# Patient Record
Sex: FEMALE | Race: WHITE | Employment: FULL TIME | URBAN - METROPOLITAN AREA
[De-identification: names, ages, dates, MRNs, and addresses within clinical notes are randomized per-mention and may not be internally consistent; named-entity substitution may affect disease eponyms.]

---

## 2017-08-04 ENCOUNTER — HOSPITAL ENCOUNTER (EMERGENCY)
Age: 22
Discharge: PSYCHIATRIC OR SUBSTANCE USE FACILITY | End: 2017-08-05
Attending: EMERGENCY MEDICINE | Admitting: EMERGENCY MEDICINE
Payer: OTHER GOVERNMENT

## 2017-08-04 DIAGNOSIS — T14.8XXA SUPERFICIAL LACERATION: ICD-10-CM

## 2017-08-04 DIAGNOSIS — R45.851 SUICIDAL IDEATIONS: Primary | ICD-10-CM

## 2017-08-04 DIAGNOSIS — Z72.89 DELIBERATE SELF-CUTTING: ICD-10-CM

## 2017-08-04 DIAGNOSIS — F14.10 COCAINE ABUSE (HCC): ICD-10-CM

## 2017-08-04 LAB
AMPHET UR QL SCN: NEGATIVE
ANION GAP BLD CALC-SCNC: 9 MMOL/L (ref 3–18)
BARBITURATES UR QL SCN: NEGATIVE
BASOPHILS # BLD AUTO: 0.1 K/UL (ref 0–0.06)
BASOPHILS # BLD: 1 % (ref 0–2)
BENZODIAZ UR QL: NEGATIVE
BUN SERPL-MCNC: 8 MG/DL (ref 7–18)
BUN/CREAT SERPL: 9 (ref 12–20)
CALCIUM SERPL-MCNC: 9.2 MG/DL (ref 8.5–10.1)
CANNABINOIDS UR QL SCN: POSITIVE
CHLORIDE SERPL-SCNC: 111 MMOL/L (ref 100–108)
CO2 SERPL-SCNC: 24 MMOL/L (ref 21–32)
COCAINE UR QL SCN: POSITIVE
CREAT SERPL-MCNC: 0.9 MG/DL (ref 0.6–1.3)
DIFFERENTIAL METHOD BLD: ABNORMAL
EOSINOPHIL # BLD: 0.1 K/UL (ref 0–0.4)
EOSINOPHIL NFR BLD: 1 % (ref 0–5)
ERYTHROCYTE [DISTWIDTH] IN BLOOD BY AUTOMATED COUNT: 12.4 % (ref 11.6–14.5)
GLUCOSE SERPL-MCNC: 65 MG/DL (ref 74–99)
HCG UR QL: NEGATIVE
HCT VFR BLD AUTO: 43.2 % (ref 35–45)
HDSCOM,HDSCOM: ABNORMAL
HGB BLD-MCNC: 14.9 G/DL (ref 12–16)
LYMPHOCYTES # BLD AUTO: 39 % (ref 21–52)
LYMPHOCYTES # BLD: 3.9 K/UL (ref 0.9–3.6)
MCH RBC QN AUTO: 31 PG (ref 24–34)
MCHC RBC AUTO-ENTMCNC: 34.5 G/DL (ref 31–37)
MCV RBC AUTO: 90 FL (ref 74–97)
METHADONE UR QL: NEGATIVE
MONOCYTES # BLD: 0.4 K/UL (ref 0.05–1.2)
MONOCYTES NFR BLD AUTO: 4 % (ref 3–10)
NEUTS SEG # BLD: 5.7 K/UL (ref 1.8–8)
NEUTS SEG NFR BLD AUTO: 55 % (ref 40–73)
OPIATES UR QL: POSITIVE
PCP UR QL: NEGATIVE
PLATELET # BLD AUTO: 310 K/UL (ref 135–420)
PMV BLD AUTO: 10.1 FL (ref 9.2–11.8)
POTASSIUM SERPL-SCNC: 3.6 MMOL/L (ref 3.5–5.5)
RBC # BLD AUTO: 4.8 M/UL (ref 4.2–5.3)
SODIUM SERPL-SCNC: 144 MMOL/L (ref 136–145)
WBC # BLD AUTO: 10.1 K/UL (ref 4.6–13.2)

## 2017-08-04 PROCEDURE — 81025 URINE PREGNANCY TEST: CPT

## 2017-08-04 PROCEDURE — 80048 BASIC METABOLIC PNL TOTAL CA: CPT | Performed by: EMERGENCY MEDICINE

## 2017-08-04 PROCEDURE — 85025 COMPLETE CBC W/AUTO DIFF WBC: CPT | Performed by: EMERGENCY MEDICINE

## 2017-08-04 PROCEDURE — 99285 EMERGENCY DEPT VISIT HI MDM: CPT

## 2017-08-04 PROCEDURE — 80307 DRUG TEST PRSMV CHEM ANLYZR: CPT | Performed by: EMERGENCY MEDICINE

## 2017-08-04 PROCEDURE — 74011250637 HC RX REV CODE- 250/637: Performed by: EMERGENCY MEDICINE

## 2017-08-04 RX ORDER — IBUPROFEN 400 MG/1
800 TABLET ORAL ONCE
Status: COMPLETED | OUTPATIENT
Start: 2017-08-04 | End: 2017-08-04

## 2017-08-04 RX ORDER — FLUOXETINE HYDROCHLORIDE 20 MG/1
20 CAPSULE ORAL DAILY
Status: DISCONTINUED | OUTPATIENT
Start: 2017-08-05 | End: 2017-08-05 | Stop reason: HOSPADM

## 2017-08-04 RX ADMIN — IBUPROFEN 800 MG: 400 TABLET, FILM COATED ORAL at 22:33

## 2017-08-04 RX ADMIN — BACITRACIN ZINC, NEOMYCIN SULFATE, POLYMYXIN B SULFATE 1 PACKET: 3.5; 5000; 4 OINTMENT TOPICAL at 20:05
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
## 2017-08-05 ENCOUNTER — HOSPITAL ENCOUNTER (INPATIENT)
Age: 22
LOS: 3 days | Discharge: HOME | DRG: 885 | End: 2017-08-08
Attending: PSYCHIATRY & NEUROLOGY | Admitting: PSYCHIATRY & NEUROLOGY
Payer: OTHER GOVERNMENT

## 2017-08-05 VITALS
HEIGHT: 63 IN | RESPIRATION RATE: 17 BRPM | HEART RATE: 76 BPM | SYSTOLIC BLOOD PRESSURE: 114 MMHG | OXYGEN SATURATION: 100 % | TEMPERATURE: 97.9 F | WEIGHT: 90 LBS | BODY MASS INDEX: 15.95 KG/M2 | DIASTOLIC BLOOD PRESSURE: 65 MMHG

## 2017-08-05 PROBLEM — F32.A DEPRESSION: Status: ACTIVE | Noted: 2017-08-05

## 2017-08-05 PROCEDURE — 74011250637 HC RX REV CODE- 250/637: Performed by: PSYCHIATRY & NEUROLOGY

## 2017-08-05 PROCEDURE — 74011250637 HC RX REV CODE- 250/637: Performed by: EMERGENCY MEDICINE

## 2017-08-05 PROCEDURE — 65220000003 HC RM SEMIPRIVATE PSYCH

## 2017-08-05 RX ORDER — DEXTROAMPHETAMINE SACCHARATE, AMPHETAMINE ASPARTATE, DEXTROAMPHETAMINE SULFATE AND AMPHETAMINE SULFATE 2.5; 2.5; 2.5; 2.5 MG/1; MG/1; MG/1; MG/1
10 TABLET ORAL 3 TIMES DAILY
COMMUNITY
End: 2017-08-08

## 2017-08-05 RX ORDER — BENZTROPINE MESYLATE 1 MG/1
1-2 TABLET ORAL
Status: DISCONTINUED | OUTPATIENT
Start: 2017-08-05 | End: 2017-08-08 | Stop reason: HOSPADM

## 2017-08-05 RX ORDER — FLUOXETINE HYDROCHLORIDE 20 MG/1
20 CAPSULE ORAL DAILY
Status: DISCONTINUED | OUTPATIENT
Start: 2017-08-05 | End: 2017-08-08 | Stop reason: HOSPADM

## 2017-08-05 RX ORDER — IBUPROFEN 200 MG
1 TABLET ORAL DAILY
Status: DISCONTINUED | OUTPATIENT
Start: 2017-08-05 | End: 2017-08-05

## 2017-08-05 RX ORDER — LORAZEPAM 2 MG/ML
1-2 INJECTION INTRAMUSCULAR
Status: DISCONTINUED | OUTPATIENT
Start: 2017-08-05 | End: 2017-08-08 | Stop reason: HOSPADM

## 2017-08-05 RX ORDER — IBUPROFEN 200 MG
1 TABLET ORAL DAILY
Status: DISCONTINUED | OUTPATIENT
Start: 2017-08-05 | End: 2017-08-05 | Stop reason: HOSPADM

## 2017-08-05 RX ORDER — TRAZODONE HYDROCHLORIDE 50 MG/1
50 TABLET ORAL
Status: DISCONTINUED | OUTPATIENT
Start: 2017-08-05 | End: 2017-08-08 | Stop reason: HOSPADM

## 2017-08-05 RX ORDER — IBUPROFEN 400 MG/1
400 TABLET ORAL
Status: DISCONTINUED | OUTPATIENT
Start: 2017-08-05 | End: 2017-08-08 | Stop reason: HOSPADM

## 2017-08-05 RX ORDER — BENZTROPINE MESYLATE 1 MG/ML
1-2 INJECTION INTRAMUSCULAR; INTRAVENOUS
Status: DISCONTINUED | OUTPATIENT
Start: 2017-08-05 | End: 2017-08-08 | Stop reason: HOSPADM

## 2017-08-05 RX ORDER — ATOMOXETINE 10 MG/1
CAPSULE ORAL DAILY
COMMUNITY
End: 2017-08-08

## 2017-08-05 RX ORDER — LORAZEPAM 1 MG/1
1-2 TABLET ORAL
Status: DISCONTINUED | OUTPATIENT
Start: 2017-08-05 | End: 2017-08-08 | Stop reason: HOSPADM

## 2017-08-05 RX ORDER — IBUPROFEN 200 MG
1 TABLET ORAL DAILY
Status: DISCONTINUED | OUTPATIENT
Start: 2017-08-05 | End: 2017-08-08 | Stop reason: HOSPADM

## 2017-08-05 RX ORDER — HALOPERIDOL 5 MG/ML
5 INJECTION INTRAMUSCULAR
Status: DISCONTINUED | OUTPATIENT
Start: 2017-08-05 | End: 2017-08-08 | Stop reason: HOSPADM

## 2017-08-05 RX ORDER — HYDROXYZINE PAMOATE 50 MG/1
50 CAPSULE ORAL
Status: DISCONTINUED | OUTPATIENT
Start: 2017-08-05 | End: 2017-08-08 | Stop reason: HOSPADM

## 2017-08-05 RX ORDER — HALOPERIDOL 5 MG/1
5 TABLET ORAL
Status: DISCONTINUED | OUTPATIENT
Start: 2017-08-05 | End: 2017-08-08 | Stop reason: HOSPADM

## 2017-08-05 RX ADMIN — FLUOXETINE 20 MG: 20 CAPSULE ORAL at 10:22

## 2017-08-05 RX ADMIN — IBUPROFEN 400 MG: 400 TABLET, FILM COATED ORAL at 15:49

## 2017-08-05 RX ADMIN — TRAZODONE HYDROCHLORIDE 50 MG: 50 TABLET ORAL at 20:36

## 2017-08-05 NOTE — IP AVS SNAPSHOT
[place] 
 
 
 [address] [facility] Patient: Linnette Holt MRN: [id number] :1995 You are allergic to the following No active allergies Recent Documentation Height Weight Breastfeeding? BMI OB Status Smoking Status 1.6 m 40.8 kg No 15.94 kg/m2 Having regular periods Current Every Day Smoker Emergency Contacts Name Discharge Info Relation Home Work [facility] About your hospitalization You were admitted on:  2017 You last received care in the:  [facility] ADULT CHEM DEP You were discharged on:  2017 Unit phone number:  [phone] Why you were hospitalized Your primary diagnosis was:  Recurrent Major Depressive Disorder (Hcc) Your diagnoses also included:  Cocaine Use Disorder, Moderate, Dependence (Hcc) Providers Seen During Your Hospitalizations Provider Role Specialty Primary office phone Elaina Howard MD Attending Provider Psychiatry [phone] Your Primary Care Physician (PCP) Primary Care Physician Office Phone Office Fax NONE ** None ** ** None ** Follow-up Information Follow up With Details Comments Contact Info None   None ([phone]) Patient stated that they have no PCP [facility]   Appointment on 2017 at 10:45 am with Ms. Ramo Sherwood NP for medication and Sept.15, 2017 at 9:45 am for therapy with Ms. Jacqui Valencia. [address] [facility] [address] [place] [address]) [facility] [place] [address]   HE PATIENT MUST CALL  [facility] FOR A REFERRAL IN ORDER TO BE SEEN. Current Discharge Medication List  
  
START taking these medications Dose & Instructions Dispensing Information Comments Morning Noon Evening Bedtime FLUoxetine 20 mg capsule Commonly known as:  PROzac Your last dose was: Your next dose is:    
   
   
 Dose:  20 mg Take 1 Cap by mouth daily. Indications: depression Quantity:  30 Cap Refills:  0 STOP taking these medications ADDERALL 10 mg tablet Generic drug:  dextroamphetamine-amphetamine STRATTERA 10 mg capsule Generic drug:  atomoxetine Where to Get Your Medications Information on where to get these meds will be given to you by the nurse or doctor. ! Ask your nurse or doctor about these medications FLUoxetine 20 mg capsule Discharge Instructions BEHAVIORAL HEALTH NURSING DISCHARGE NOTE Emergency Numbers [address] [facility] Desk: [phone] [facility]: [phone] Suicide Prevention Line: [phone] (TALK) The following personal items collected during your admission are returned to you:  
Dental Appliance: Dental Appliances: None Vision: Visual Aid: None Hearing Aid:   
Jewelry: Jewelry: None Clothing: Clothing: Belt, Undergarments, Shorts (manohar shorts, tank top, bra with underwire, underwear belt NO SHOES) Other Valuables: Other Valuables: Cell Phone (cell phone cracked screen) Valuables sent to safe: Personal Items Sent to Safe: none PATIENT INSTRUCTIONS: 
 
 
. 
 
 diet: Regular Diet The discharge information has been reviewed with the patient. The patient verbalized understanding. Patient armband removed and shredded. Discharge Orders None [place] Announcement We are excited to announce that we are making your provider's discharge notes available to you in Goby. You will see these notes when they are completed and signed by the physician that discharged you from your recent hospital stay.   If you have any questions or concerns about any information you see in Goby, please call the Health Information Department where you were seen or reach out to your Primary Care Provider for more information about your plan of care. Introducing [facility]! [employer] introduces Reissued patient portal. Now you can access parts of your medical record, email your doctor's office, and request medication refills online. 1. In your internet browser, go to [email or web address] 2. Click on the First Time User? Click Here link in the Sign In box. You will see the New Member Sign Up page. 3. Enter your Reissued Access Code exactly as it appears below. You will not need to use this code after youve completed the sign-up process. If you do not sign up before the expiration date, you must request a new code. · Reissued Access Code: [email or web address] Expires: 11/5/2017  8:20 AM 
 
4. Enter the last four digits of your Social Security Number (xxxx) and Date of Birth (mm/dd/yyyy) as indicated and click Submit. You will be taken to the next sign-up page. 5. Create a Reissued ID. This will be your Reissued login ID and cannot be changed, so think of one that is secure and easy to remember. 6. Create a Reissued password. You can change your password at any time. 7. Enter your Password Reset Question and Answer. This can be used at a later time if you forget your password. 8. Enter your e-mail address. You will receive e-mail notification when new information is available in [address] 9. Click Sign Up. You can now view and download portions of your medical record. 10. Click the Download Summary menu link to download a portable copy of your medical information. If you have questions, please visit the Frequently Asked Questions section of the Reissued website. Remember, Reissued is NOT to be used for urgent needs. For medical emergencies, dial 911. Now available from your iPhone and Android! General Information Please provide this summary of care documentation to your next provider.  
  
  
    
    
 Patient Signature: ____________________________________________________________ Date:  ____________________________________________________________  
  
Rexann Ports Provider Signature:  ____________________________________________________________ Date:  ____________________________________________________________
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
## 2017-08-05 NOTE — IP AVS SNAPSHOT
[place] 
 
 
 [address] [facility] [address] Patient: Albertine Dubin MRN: [id number] :1995 Current Discharge Medication List  
  
START taking these medications Dose & Instructions Dispensing Information Comments Morning Noon Evening Bedtime FLUoxetine 20 mg capsule Commonly known as:  PROzac Your last dose was: Your next dose is:    
   
   
 Dose:  20 mg Take 1 Cap by mouth daily. Indications: depression Quantity:  30 Cap Refills:  0 STOP taking these medications ADDERALL 10 mg tablet Generic drug:  dextroamphetamine-amphetamine STRATTERA 10 mg capsule Generic drug:  atomoxetine Where to Get Your Medications Information on where to get these meds will be given to you by the nurse or doctor. ! Ask your nurse or doctor about these medications FLUoxetine 20 mg capsule

## 2017-08-05 NOTE — ED NOTES
Patient requests nicotine patch. Patient states she smokes 1 pack a day. Dr. Carter Esters notified.

## 2017-08-05 NOTE — ED NOTES
Patient has unwrapped bandages. Patient complains of wrist pain. Dr. Ilsa Alonso notified. Orders received, see MAR.

## 2017-08-05 NOTE — BSMART NOTE
Counselor contacted Sandy at [facility] and William Pedro at [facility] regarding an inpatient bed. Sandy requested the MRN number and William Pedro requested that the chart be faxed. This was done. Still waiting on a response. Lona Grossman.  Marychuy Arana, Ph.D., LPC, CSAC, BCPC, CCTP, CAMS-I  Crisis Counselor
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
## 2017-08-05 NOTE — ED NOTES
9:00 PM: I assumed care of the pt from Dr. Zuleyma Grigsby while pt awaited bed placement. There were no acute events while pt was in the department. Disposition: Transferred    Scribe Attestation      Faiza Leroy acting as a scribe for and in the presence of Terrell Mcdermott MD      August 05, 2017 at 4:08 AM       Provider Attestation:      I personally performed the services described in the documentation, reviewed the documentation, as recorded by the scribe in my presence, and it accurately and completely records my words and actions.  August 05, 2017 at 4:08 AM - Terrell Mcdermott MD

## 2017-08-05 NOTE — BH NOTES
GROUP THERAPY PROGRESS NOTE    [facility] is participating in [facility].      Group time: 20 minutes    Personal goal for participation: talk w/md      Goal orientation: community    Group therapy participation: active    Therapeutic interventions reviewed and discussed: rules and regulations

## 2017-08-05 NOTE — ED NOTES
TRANSFER - OUT REPORT:    Verbal report given to Karan Rousseau RN (name) on Sasha Solis  being transferred to [facility] for routine progression of care       Report consisted of patients Situation, Background, Assessment and   Recommendations(SBAR). Information from the following report(s) SBAR, Kardex and MAR was reviewed with the receiving nurse. Lines:       Opportunity for questions and clarification was provided.       Patient transported with:  [facility]

## 2017-08-05 NOTE — BH NOTES
Pt ate all meals, in and out of milieu, interacting with staff. Denies SI/HI,A/VH. Resting in bed asleep during some part of morning and afternoon after meals. Encouraged use of gripper socks, voiced no concerns. VSS.

## 2017-08-05 NOTE — IP AVS SNAPSHOT
Summary of Care Report The Summary of Care report has been created to help improve care coordination. Users with access to [employer] [place] or [employer] (Web-based application) may access additional patient information including the Discharge Summary. If you are not currently a [employer] user and need more information, please call the number listed below in the [place] [address] section and ask to be connected with Medical Records. Facility Information Name Address Phone [place] [id number] [facility] [address] [phone] Patient Information Patient Name Sex  Hossein Barr ([id number]) Female 1995 Discharge Information Admitting Provider Service Area Unit Mar Rodas MD / [address] Adult Chem Dep / [phone] Discharge Provider Discharge Date/Time Discharge Disposition Destination Pastora Paulson MD / [phone] 17 [address] [facility] Patient Language Language ENGLISH [13] Hospital Problems as of 2017  Never Reviewed Class Noted - Resolved Last Modified POA Active Problems * (Principal)Recurrent major depressive disorder ([facility] [address]  2017 - Present 2017 by Pastora Paulson MD Unknown Entered by Pastora Paulson MD  
  Cocaine use disorder, moderate, dependence ([facility] [address]  2017 - Present 2017 by Pastora Paulson MD Unknown Entered by Pastora Paulson MD  
  
You are allergic to the following No active allergies Current Discharge Medication List  
  
START taking these medications Dose & Instructions Dispensing Information Comments FLUoxetine 20 mg capsule Commonly known as:  PROzac Dose:  20 mg Take 1 Cap by mouth daily. Indications: depression Quantity:  30 Cap Refills:  0 STOP taking these medications Comments ADDERALL 10 mg tablet Generic drug:  dextroamphetamine-amphetamine STRATTERA 10 mg capsule Generic drug:  atomoxetine Follow-up Information Follow up With Details Comments Contact Info None   None (395) Patient stated that they have no PCP [facility]   Appointment on Sept.1, 2017 at 10:45 am with MsCarin Scruggs NP for medication and Sept.15, 2017 at 9:45 am for therapy with Ms. Aydee Freeman. [address] [facility] [address] [address] ([facility]) [facility]  [address]   HE PATIENT MUST CALL  [facility] IN ADVANCE FOR A REFERRAL IN ORDER TO BE SEEN. Discharge Instructions BEHAVIORAL HEALTH NURSING DISCHARGE NOTE Emergency Numbers [address] [facility] Desk: [phone] [facility] Emergency Services: [phone] Suicide Prevention Line: [phone] (TALK) The following personal items collected during your admission are returned to you:  
Dental Appliance: Dental Appliances: None Vision: Visual Aid: None Hearing Aid:   
Jewelry: Jewelry: None Clothing: Clothing: Belt, Undergarments, Shorts (manohar shorts, tank top, bra with underwire, underwear belt NO SHOES) Other Valuables: Other Valuables: Cell Phone (cell phone cracked screen) Valuables sent to safe: Personal Items Sent to Safe: none PATIENT INSTRUCTIONS: 
 
 
. 
 
 diet: Regular Diet The discharge information has been reviewed with the patient. The patient verbalized understanding. Patient armband removed and shredded. Chart Review Routing History No Routing History on File

## 2017-08-05 NOTE — BH NOTES
4110 patient arrived on ADCD Unit. Belongings checked and noted, vital signs done. Patient polite and appropriate with staff. Report given to day nurse from [address] [facility] [address]. Patient sitting quietly in day area.

## 2017-08-05 NOTE — BH NOTES
GROUP THERAPY PROGRESS NOTE    Linnette Holt is participating in Recreational Therapy. Group time: 6685    Personal goal for participation: fresh air break/games on the unit    Goal orientation: social    Group therapy participation: active    Therapeutic interventions reviewed and discussed: Staff encouraged Pt to get off the unit and go outside and get some fresh air, or play games on the unit with peers. Impression of participation:   Pt. chose to stay on the unit, play games with peers, color gabrielle patterns and watch a movie.

## 2017-08-05 NOTE — PROGRESS NOTES
NUTRITION    Nutrition Screen      RECOMMENDATIONS / PLAN:     - Add supplements: Ensure Enlive TID.  - Continue RD inpatient monitoring and evaluation. NUTRITION DIAGNOSIS & INTERVENTIONS:     [x] Meals/Snacks: general/healthful diet  [x] Medical food supplementation     Nutrition Diagnosis:  Underweight related to inadequate energy intake as evidenced by BMI of 15.9 kg/m^2. ASSESSMENT:      Pt just admitted, started on regular diet. Low BG noted. Average intake adequate to meet patients estimated nutritional needs:   [] Yes     [] No      [x] Unable to determine at this time    Diet: DIET REGULAR    Food Allergies: NKFA  Current Appetite:   [] Good     [] Fair     [] Poor     [x] Other: unknown   Appetite/meal intake prior to admission:   [] Good     [] Fair     [] Poor     [x] Other: unknown   Feeding Limitations:  [] Swallowing Difficulty       [] Chewing Difficulty       [] Other   Current Meal Intake: No data found. Gastrointestinal Issues:  [] Yes    [] No   Skin Integrity:  WDL    Pertinent Medications:  Reviewed   Labs:  Reviewed     Anthropometrics:  Ht Readings from Last 1 Encounters:   08/05/17 5' 3\" (1.6 m)     Last 3 Recorded Weights in this Encounter    08/05/17 0722   Weight: 40.8 kg (90 lb)     Body mass index is 15.94 kg/(m^2). Underweight     Weight History:   Weight Metrics 8/5/2017 8/4/2017   Weight 90 lb 90 lb   BMI 15.94 kg/m2 15.94 kg/m2       Admitting Diagnosis: Depression  No past medical history on file.      Education Needs:        [x] None identified  [] Identified - Not appropriate at this time  []  Identified and addressed - refer to education log  Learning Limitations:   [x] None identified  [] Identified    Cultural, Presybeterian & ethnic food preferences identified:  [x] None    [] Yes      ESTIMATED NUTRITION NEEDS:     1472-5405 kcal (MSJx1.2-1.3), 42-52 gm protein (0.8-1 gm/kg), 1 mL/kcal  Based on: 52 kg       [] Actual BW      [x] IBW         MONITORING & EVALUATION:     Nutrition Goal(s):   1. Po intake of meals will meet >75% of patient estimated nutritional needs within the next 7 days.   Outcome:   [] Met    []  Not Met   [x] New/Initial Goal    Monitor:  [x] Meal intake    [x] Diet tolerance    [x] Supplement intake    Previous Recommendations (for follow-up assessments only):     []   Implemented       []   Not Implemented (RD to address)    [] No Recommendation Made      Discharge Planning: regular diet   [x]  Participated in care planning, discharge planning, & interdisciplinary rounds as appropriate      Gaetano Loza, [address]    Pager: [phone]

## 2017-08-05 NOTE — H&P
Psychiatry History and Physical    Subjective:     Date of Evaluation:  8/5/2017    Reason for Referral:  Sasha Solis was referred to the examiners from ER/[facility] for SI-cut self. History of Presenting Problem: 21y/o C female in NAD well developed and nourished. Reports she is not suicidal. UDS +Cocaine/Opiates/THC. Medical problems: ADHD, Depression, Bipolar, STEPHANIE and Polysubstance abuse. Self cutter. There are no active problems to display for this patient. No past medical history on file. No past surgical history on file. No family history on file.    Social History   Substance Use Topics    Smoking status: Current Every Day Smoker    Smokeless tobacco: Never Used    Alcohol use Yes     Prior to Admission medications    Not on File     No Known Allergies     Review of Systems - History obtained from chart review and the patient  General ROS: positive for  - sleep disturbance  Psychological ROS: positive for - depression and sleep disturbances  Respiratory ROS: no cough, shortness of breath, or wheezing  Cardiovascular ROS: no chest pain or dyspnea on exertion  Gastrointestinal ROS: no abdominal pain, change in bowel habits, or black or bloody stools  Genito-Urinary ROS: no dysuria, trouble voiding, or hematuria  Musculoskeletal ROS: negative  Neurological ROS: no TIA or stroke symptoms  Dermatological ROS: has superficial lac to Left FA-self inflicted      Objective:     Patient Vitals for the past 8 hrs:   BP Temp Pulse Resp Height Weight   08/05/17 0722 - - - - 5' 3\" (1.6 m) 40.8 kg (90 lb)   08/05/17 0713 110/74 97.4 °F (36.3 °C) 64 14 - -       Mental Status exam: WNL except for    Sensorium  oriented to time, place and person   Orientation person, place, time/date and situation   Relations cooperative   Eye Contact appropriate   Appearance:  age appropriate and within normal Limits   Motor Behavior:  gait stable and within normal limits   Speech:  normal volume   Vocabulary average   Thought Process: logical   Thought Content free of delusions and free of hallucinations   Suicidal ideations no plan  and no intention   Homicidal ideations no plan  and no intention   Mood:  depressed   Affect:  depressed   Memory recent  adequate   Memory remote:  adequate   Concentration:  adequate   Abstraction:  concrete   Insight:  good   Reliability fair   Judgment:  fair           Physical Exam:   Visit Vitals    /74 (BP 1 Location: Right arm, BP Patient Position: At rest)    Pulse 64    Temp 97.4 °F (36.3 °C)    Resp 14    Ht 5' 3\" (1.6 m)    Wt 40.8 kg (90 lb)    BMI 15.94 kg/m2     General appearance: alert, cooperative, no distress, appears stated age  Head: Normocephalic, without obvious abnormality, atraumatic  Eyes: negative  Ears: normal TM's and external ear canals AU  Nose: Nares normal. Septum midline. Mucosa normal. No drainage or sinus tenderness. Throat: Lips, mucosa, and tongue normal. Teeth and gums normal  Neck: supple, symmetrical, trachea midline, no adenopathy, thyroid: not enlarged, symmetric, no tenderness/mass/nodules, no carotid bruit and no JVD  Back: symmetric, no curvature. ROM normal. No CVA tenderness. Lungs: clear to auscultation bilaterally  Heart: regular rate and rhythm, S1, S2 normal, no murmur, click, rub or gallop  Abdomen: soft, non-tender. Bowel sounds normal. No masses,  no organomegaly  Extremities: extremities normal, atraumatic, no cyanosis or edema  Pulses: 2+ and symmetric  Skin: Skin color, texture, turgor normal. No rashes or lesions or has superficial lac to left FA-self inflicted  Lymph nodes: Cervical, supraclavicular, and axillary nodes normal.  Neurologic: Grossly normal        Impression:    Active Problems:    * No active hospital problems.  *        Plan:     Recommendations for Treatment/Conditions:  Psychiatric treatment recommended while in hospital  Admit to Psych services for SI                                                  Depression with anxiety                                                  Bipolar                                                  Polysubstance abuse    Referral To: Inpatient psychiatric care    Competency Statement: At the current time, the patient is competent to make informed consent regarding their current medical care and discharge planning and/or financial decisions.       [employer], [place]   8/5/2017 9:05 AM

## 2017-08-06 PROCEDURE — 65220000003 HC RM SEMIPRIVATE PSYCH

## 2017-08-06 PROCEDURE — 74011250637 HC RX REV CODE- 250/637: Performed by: PSYCHIATRY & NEUROLOGY

## 2017-08-06 RX ADMIN — TRAZODONE HYDROCHLORIDE 50 MG: 50 TABLET ORAL at 21:07

## 2017-08-06 RX ADMIN — FLUOXETINE 20 MG: 20 CAPSULE ORAL at 08:34

## 2017-08-06 NOTE — BH NOTES
Gregory Michael is not participating in Recreational Therapy. Group time: 9168    Personal goal for participation: fresh air break    Goal orientation: social    Group therapy participation: refuse    Therapeutic interventions reviewed and discussed: Staff encouraged Pt.  To participate in group    Impression of participation: Pt refuse, chose to rest in bed despite staff encouragement

## 2017-08-06 NOTE — BH NOTES
Psychiatry progress note    Chart reviewed, patient interviewed. Patient complained she feels like shit. Complained she feels sad, has low energy and went back to bed after waking up. Patient asked about drug use. Reported she was using cannabis daily and cocaine twice per week. Patient stated she has spoken to her  who is going to be released from [facility]. Patient pleased with this stated they will be starting over completely and moving to a different city. Patient wants to be around family right now. Patient unsure if Prozac is helping doesn't feel she has taken it long enough. On exam, depressed. No SI, HI or AVH. Insight and judgment fair. Meds - Prozac 20. Assessment - Recurrent major depressive disorder, severe without psychotic  Features. Additional diagnoses of cocaine use disorder, severe and cannabis use disorder, severe are warranted. Symptoms not optimally controlled. Plan is to continue current treatment plan.

## 2017-08-06 NOTE — PROGRESS NOTES
Problem: Suicide/Homicide (Adult/Pediatric)  Goal: *STG: Seeks staff when feelings of self harm or harm towards others arise  Patient will talk with staff every shift while awake re: feelings of self harm if they arise throughout hospital stay. Outcome: Progressing Towards Goal  Denies feelings of self harm or harm towards others, denies A/VH  Goal: *STG: Attends activities and groups  Patient will attend at least 2 groups/activities every day throughout hospital stay. Outcome: Not Progressing Towards Goal  Pt does not attend groups and activities  Goal: *STG/LTG: Complies with medication therapy  Patient will take prescribed medications as ordered throughout hospital stay. Outcome: Progressing Towards Goal  Pt compliant with medication therapy    Comments:   Patient is calm, pleasant, cooperative, smiles during assessment. She sleeps most of the day, states she had 10 hours of sleep, woke up twice and went back to sleep. She states she sleeps 12 hours at home and just want to sleep. Pt compliant with medication therapy. She ate some of her breakfast and refused lunch with staff encouragement, offered twice and declined. Pt does not attend group and activities. She denies feelings of harm to self or others, denies A/VH, contracts for safety. Pt admits to being depressed. Encouraged positive coping strategies and life skills in dealing with stressors, and use of relaxation techniques. Pt is receptive and interested. She got out of bed and encouraged to do personal hygiene before seeing doctor shortly.  Continues monitor safety and provide supportive environment

## 2017-08-06 NOTE — BH NOTES
GROUP THERAPY PROGRESS NOTE    Jenene Lennox is participating in Recreational Therapy. Group time: 50 minutes    Personal goal for participation: Fresh air and activity group downstairs    Goal orientation: relaxation    Group therapy participation: active    Therapeutic interventions reviewed and discussed: Active    Impression of participation: Patient enjoyed going outside for fresh air and recreation group.

## 2017-08-06 NOTE — BH NOTES
Patient states she is feeling \"okay\" today. Patient has been pleasant and cooperative throughout the shift, spending most of the evening out in the day area socializing with peers and coloring. Patient attended groups on the unit and did not have any visitors this afternoon. Patient has been medication compliant, ate 100% of meals and snacks and wore non-slip footwear throughout the shift. Patient had no falls this evening.

## 2017-08-06 NOTE — BH NOTES
GROUP THERAPY PROGRESS NOTE    Nissa Serrano is not  participating in [place]. Staff encouraged and pt refused.

## 2017-08-07 PROBLEM — F33.9 RECURRENT MAJOR DEPRESSIVE DISORDER (HCC): Status: ACTIVE | Noted: 2017-08-07

## 2017-08-07 PROBLEM — F14.20 COCAINE USE DISORDER, MODERATE, DEPENDENCE (HCC): Status: ACTIVE | Noted: 2017-08-07

## 2017-08-07 PROCEDURE — 74011250637 HC RX REV CODE- 250/637: Performed by: PSYCHIATRY & NEUROLOGY

## 2017-08-07 PROCEDURE — 65220000003 HC RM SEMIPRIVATE PSYCH

## 2017-08-07 RX ORDER — MAG HYDROX/ALUMINUM HYD/SIMETH 200-200-20
30 SUSPENSION, ORAL (FINAL DOSE FORM) ORAL
Status: DISCONTINUED | OUTPATIENT
Start: 2017-08-07 | End: 2017-08-08 | Stop reason: HOSPADM

## 2017-08-07 RX ADMIN — FLUOXETINE 20 MG: 20 CAPSULE ORAL at 08:28

## 2017-08-07 RX ADMIN — TRAZODONE HYDROCHLORIDE 50 MG: 50 TABLET ORAL at 20:39

## 2017-08-07 NOTE — PROGRESS NOTES
Problem: Suicide/Homicide (Adult/Pediatric)  Goal: *STG: Seeks staff when feelings of self harm or harm towards others arise  Patient will talk with staff every shift while awake re: feelings of self harm if they arise throughout hospital stay. Outcome: Progressing Towards Goal  Patient contracts for safety. Goal: *STG: Attends activities and groups  Patient will attend at least 2 groups/activities every day throughout hospital stay. Outcome: Progressing Towards Goal  Patient has been attending groups today. Goal: *STG/LTG: Complies with medication therapy  Patient will take prescribed medications as ordered throughout hospital stay. Outcome: Progressing Towards Goal  Patient takes medications as ordered. Comments:   Patient has been present in the milieu today socializing with peers. Patient states that last evening she had an epiphany and feels better. Patient denies SI/HI and AVH and states that she was wrong in attempting to harm herself. Patient was hoping to be discharged today but understands that the doctor wants to wait a day or two and she is understanding of that.
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
## 2017-08-07 NOTE — BH NOTES
GROUP THERAPY PROGRESS NOTE    Linnette Holt is participating in Coping Skills Group and Positive thoughts. Group time: 1.5 hour    Goal orientation: personal    Group therapy participation: active    Therapeutic interventions reviewed and discussed:  Positive self-talk, coping skills. Patients were given a small pocket-sized journal, lists of positive affirmations, and a variety of craft materials to begin a journal for positive affirmations use as a coping skill. Impression of participation:   Corina Galeazzi participated fully in the group discussion on coping skills and positive self-talk. She really enjoyed making her journal.  She was social with peers and interacted appropriately.

## 2017-08-07 NOTE — BH NOTES
GROUP THERAPY PROGRESS NOTE    Marce Olguin is not participating in Recreational Therapy.      Group time: 30 min    Personal goal for participation: n/a    Goal orientation: social    Group therapy participation: Declined    Therapeutic interventions reviewed and discussed: Positive social interaction and recreation    Impression of participation: Declined participation

## 2017-08-07 NOTE — BSMART NOTE
ART THERAPY GROUP PROGRESS NOTE    PATIENT SCHEDULED FOR GROUP AT: 14:15    ATTENDANCE: Full    PARTICIPATION LEVEL: Participates fully in the art process    ATTENTION LEVEL: Able to focus on task    FOCUS: [place] [address] and positive thinking patterns. SYMBOLIC & THEMATIC CONTENT AS NOTED IN IMAGERY: She was calm, compliant, and presented with a bright affect. She was invested in the task at hand and actively participated in group discussion. She shared how she tends to fall into the same unhealthy patterns of behavior because it is \"comfortable,\" even though she knows the negative consequences. She was able to identify and challenge negative thoughts and cognitive distortions with positive affirmations.

## 2017-08-08 VITALS
BODY MASS INDEX: 15.95 KG/M2 | DIASTOLIC BLOOD PRESSURE: 68 MMHG | TEMPERATURE: 96.9 F | SYSTOLIC BLOOD PRESSURE: 108 MMHG | WEIGHT: 90 LBS | HEART RATE: 81 BPM | HEIGHT: 63 IN | RESPIRATION RATE: 16 BRPM

## 2017-08-08 PROCEDURE — 74011250637 HC RX REV CODE- 250/637: Performed by: PSYCHIATRY & NEUROLOGY

## 2017-08-08 RX ORDER — FLUOXETINE HYDROCHLORIDE 20 MG/1
20 CAPSULE ORAL DAILY
Qty: 30 CAP | Refills: 0 | Status: SHIPPED | OUTPATIENT
Start: 2017-08-08

## 2017-08-08 RX ADMIN — FLUOXETINE 20 MG: 20 CAPSULE ORAL at 08:11

## 2017-08-08 NOTE — BH NOTES
Katie Ni is  participating in Leisure-Creative Group. Group time: [id number]    Personal goal for participation:  Positive life changes    Goal orientation: leisure    Group therapy participation: active    Therapeutic interventions reviewed and discussed: Staff encouraged Pt.  To think positive    Impression of participation: Pt.  chose to be positive about life and making positive changes to turn a bad situation into a better  situation

## 2017-08-08 NOTE — BH NOTES
Patient states she could not find a ride home and no shoes for bus ride, JET informed and will assist with slippers, pt aware. Provided discharge instructions with script and follow up appointment. Opportunity to ask questions verbalized understanding. Awaiting transport arrangement with JET.
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
## 2017-08-08 NOTE — BH NOTES
Patient states she has shoes and able to take bus for ride home, given bus ticket as requested. Assisted by staff to front entrance discharged ambulatory to home by bus with belongings.

## 2017-08-08 NOTE — BH NOTES
Patient is calm, quiet, cooperative, compliant with medication therapy She is visible in milieu, sits in corner quietly, watches TV, eats all meals. Pt attends some group and activities with staff encouragement. She denies feelings of harm to self or others, denies A/VH. Pt denies being depressed. Pt. mood is happy. Staff  encouraged use of positive coping and lifeskills strategies. Pt. states of ways to deal with stressors including watching TV, meditation, reading, coloring pictures, and deep breathing to calm self. Pt states her medications is working. Pt. is receptive to staff. Encouraged use of skid proof  socks, continues to monitor for behavior safety and location.

## 2017-08-08 NOTE — DISCHARGE INSTRUCTIONS
BEHAVIORAL HEALTH NURSING DISCHARGE NOTE    Emergency Numbers    : [facility] Emergency Services: [phone]  Suicide Prevention Line: 5 [phone] (TALK)          The following personal items collected during your admission are returned to you:   Dental Appliance: Dental Appliances: None  Vision: Visual Aid: None  Hearing Aid:    Jewelry: Jewelry: None  Clothing: Clothing: Belt, Undergarments, Shorts (manohar shorts, tank top, bra with underwire, underwear belt NO SHOES)  Other Valuables: Other Valuables: Cell Phone (cell phone cracked screen)  Valuables sent to safe: Personal Items Sent to Safe: none      PATIENT INSTRUCTIONS:      .     diet: Regular Diet        The discharge information has been reviewed with the patient. The patient verbalized understanding. Patient armband removed and shredded.

## 2017-08-08 NOTE — BH NOTES
GROUP THERAPY PROGRESS NOTE    Tobyhanna Bahman is participating in [place].      Group time: 30 minutes    Personal goal for participation: discuss guideline compliance, unit issues and community announcements; discuss daily Tx goal(s)                 Goal orientation: community    Group therapy participation: active
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
## 2018-04-13 ENCOUNTER — HOSPITAL ENCOUNTER (EMERGENCY)
Age: 23
Discharge: HOME | End: 2018-04-13
Attending: EMERGENCY MEDICINE | Admitting: EMERGENCY MEDICINE
Payer: OTHER GOVERNMENT

## 2018-04-13 ENCOUNTER — APPOINTMENT (OUTPATIENT)
Dept: GENERAL RADIOLOGY | Age: 23
End: 2018-04-13
Attending: PHYSICIAN ASSISTANT
Payer: OTHER GOVERNMENT

## 2018-04-13 VITALS
SYSTOLIC BLOOD PRESSURE: 112 MMHG | RESPIRATION RATE: 16 BRPM | DIASTOLIC BLOOD PRESSURE: 69 MMHG | TEMPERATURE: 97.9 F | HEART RATE: 87 BPM | OXYGEN SATURATION: 100 %

## 2018-04-13 DIAGNOSIS — S90.31XA CONTUSION OF RIGHT FOOT, INITIAL ENCOUNTER: Primary | ICD-10-CM

## 2018-04-13 PROCEDURE — 75810000053 HC SPLINT APPLICATION

## 2018-04-13 PROCEDURE — 99284 EMERGENCY DEPT VISIT MOD MDM: CPT

## 2018-04-13 PROCEDURE — 73610 X-RAY EXAM OF ANKLE: CPT

## 2018-04-13 PROCEDURE — 73630 X-RAY EXAM OF FOOT: CPT

## 2018-04-13 RX ORDER — NAPROXEN 500 MG/1
500 TABLET ORAL 2 TIMES DAILY WITH MEALS
Qty: 20 TAB | Refills: 0 | Status: SHIPPED | OUTPATIENT
Start: 2018-04-13 | End: 2018-04-23
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
## 2018-04-14 NOTE — DISCHARGE INSTRUCTIONS
Contusion: Care Instructions  Your Care Instructions    Contusion is the medical term for a bruise. It is the result of a direct blow or an impact, such as a fall. Contusions are common sports injuries. Most people think of a bruise as a black-and-blue spot. This happens when small blood vessels get torn and leak blood under the skin. But bones, muscles, and organs can also get bruised. This may damage deep tissues but not cause a bruise you can see. The doctor will do a physical exam to find the location of your contusion. You may also have tests to make sure you do not have a more serious injury, such as a broken bone or nerve damage. These may include X-rays or other imaging tests like a CT scan or MRI. Deep-tissue contusions may cause pain and swelling. But if there is no serious damage, they will often get better in a few weeks with home treatment. The doctor has checked you carefully, but problems can develop later. If you notice any problems or new symptoms, get medical treatment right away. Follow-up care is a key part of your treatment and safety. Be sure to make and go to all appointments, and call your doctor if you are having problems. It's also a good idea to know your test results and keep a list of the medicines you take. How can you care for yourself at home? · Put ice or a cold pack on the sore area for 10 to 20 minutes at a time to stop swelling. Put a thin cloth between the ice pack and your skin. · Be safe with medicines. Read and follow all instructions on the label. ¨ If the doctor gave you a prescription medicine for pain, take it as prescribed. ¨ If you are not taking a prescription pain medicine, ask your doctor if you can take an over-the-counter medicine. · If you can, prop up the sore area on pillows as much as possible for the next few days. Try to keep the sore area above the level of your heart. When should you call for help?   Call your doctor now or seek immediate medical care if:  ? · Your pain gets worse. ? · You have new or worse swelling. ? · You have tingling, weakness, or numbness in the area near the contusion. ? · The area near the contusion is cold or pale. ? Watch closely for changes in your health, and be sure to contact your doctor if:  ? · You do not get better as expected. Where can you learn more? Go to http://jeremiah-delores.info/. Enter R110 in the search box to learn more about \"Contusion: Care Instructions. \"  Current as of: March 20, 2017  Content Version: 11.4  © 9688-6781 [employer]). Care instructions adapted under license by [employer] (which disclaims liability or warranty for this information). If you have questions about a medical condition or this instruction, always ask your healthcare professional. [address] any warranty or liability for your use of this information. Caring for Your Splint: After Your Visit  Your Care Instructions     A splint protects a broken bone or other injury. If you have a removable splint, follow your doctor's instructions and only remove the splint if your doctor says you can. Most splints can be adjusted. Your doctor will show you how to do this and will tell you when you might need to adjust the splint. Many splints are premade. Your doctor may also make a splint from plaster or fiberglass. Some splints have a built-in air cushion. Air pads are inflated to hold the injured area in place. Follow-up care is a key part of your treatment and safety. Be sure to make and go to all appointments, and call your doctor if you are having problems. It's also a good idea to know your test results and keep a list of the medicines you take. How can you care for yourself at home? General care  · Don't put any weight on a splint. If you have a walking boot, your doctor will tell you when you can put weight on it.   · If the fingers or toes on the limb with the splint were not injured, wiggle them every now and then. This helps move the blood and fluids in the injured limb. · Prop up the injured arm or leg on a pillow when you ice it or anytime you sit or lie down during the next 3 days. Try to keep it above the level of your heart. This will help reduce swelling. · Put ice or cold packs on the hurt area for 10 to 20 minutes at a time. Try to do this every 1 to 2 hours for the next 3 days (when you are awake) or until the swelling goes down. Be careful not to get the splint wet. · Be safe with medicines. Read and follow all instructions on the label. ¨ If the doctor gave you a prescription medicine for pain, take it as prescribed. ¨ If you are not taking a prescription pain medicine, ask your doctor if you can take an over-the-counter medicine. · Keep up your muscle strength and tone as much as you can while protecting your injured limb or joint. Your doctor may want you to tense and relax the muscles protected by the splint. Check with your doctor or physical therapist for instructions. Splint and skin care  · If your splint is not to be removed, try blowing cool air from a hair dryer or fan into the splint to help relieve itching. Never stick items under your splint to scratch the skin. · Do not use oils or lotions near your splint. If the skin becomes red or sore around the edge of the splint, you may pad the edges with a soft material, such as moleskin, or use tape to cover the edges. · If you're allowed to take your splint off, be sure your skin is dry before you put it back on. · Watch for pressure sores. These can develop over bony areas. Symptoms include a warm spot under the cast, pain, drainage, or an odor. Call your doctor if you think you have a pressure sore. · Watch for compartment syndrome. This happens when pressure builds up in a group of muscles, nerves, and blood vessels. It is an emergency.  Symptoms include severe pain or tingling or numbness. Water and your splint  · Keep your splint dry. Moisture can collect under the splint and cause skin irritation and itching. If you have a wound or have had surgery, moisture under the splint can increase the risk of infection. · Cover your splint with at least two layers of plastic when you take a shower or bath, unless your doctor said you can take it off while bathing. · If you can take the splint off when you bathe, pat the area dry after bathing and put the splint back on.  · If your splint gets a little wet, you can dry it with a hair dryer. Use a \"cool\" setting. When should you call for help? Call your doctor now or seek immediate medical care if:  · You have increased or severe pain. · You feel a warm or painful spot under the splint. · You have problems with your splint. For example:  ¨ The skin under the splint burns or stings. ¨ The splint feels too tight. ¨ There is a lot of swelling near the splint. (Some swelling is normal.)  ¨ You have a new fever. ¨ There is drainage or a bad smell coming from the splint. · Your foot or hand is cool or pale or changes color. · You have trouble moving your fingers or toes. · You have symptoms of a blood clot in your arm or leg (called a deep vein thrombosis). These may include:  ¨ Pain in the arm, calf, back of the knee, thigh, or groin. ¨ Redness and swelling in the arm, leg, or groin. Watch closely for changes in your health, and be sure to contact your doctor if:  · The splint is breaking apart or losing its shape. · You are not getting better as expected. Where can you learn more? Go to [employer]be  Enter J264 in the search box to learn more about \"Caring for Your Splint: After Your Visit. \"   © 6340-6885 [employer]. Care instructions adapted under license by [employer] (which disclaims liability or warranty for this information).  This care instruction is for use with your licensed healthcare professional. If you have questions about a medical condition or this instruction, always ask your healthcare professional. [place] [address] any warranty or liability for your use of this information.   Content Version: 04.7.075449; Current as of: June 4, 2014

## 2018-04-14 NOTE — PROGRESS NOTES
Called patient to inform her of right foot fracture, not previously diagnosed in ED. Patient educated to elevated foot when possible, avoid weight bearing, and continue wearing short leg posterior splint. patient educated about importance of orthopedic follow-up. Patient denied questions.  Negin Michael NP 2:22 PM
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
## 2018-04-14 NOTE — ED NOTES
I have reviewed discharge instruction and prescriptions with patient. Patient verbalized understanding and has no further questions at this time. Education taught and patient verbalized understanding of education. Teach back method used. Armband removed and shredded per patients request.    Patients pain 0/10. Belongings given to patient. Patient discharged with belongings, crutches and instructions to waiting room to await ride.
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
